# Patient Record
Sex: FEMALE | Race: WHITE | HISPANIC OR LATINO | URBAN - METROPOLITAN AREA
[De-identification: names, ages, dates, MRNs, and addresses within clinical notes are randomized per-mention and may not be internally consistent; named-entity substitution may affect disease eponyms.]

---

## 2018-12-05 ENCOUNTER — EMERGENCY (EMERGENCY)
Facility: HOSPITAL | Age: 43
LOS: 1 days | Discharge: HOME | End: 2018-12-05
Admitting: EMERGENCY MEDICINE
Payer: COMMERCIAL

## 2018-12-05 VITALS
DIASTOLIC BLOOD PRESSURE: 96 MMHG | SYSTOLIC BLOOD PRESSURE: 160 MMHG | TEMPERATURE: 98 F | RESPIRATION RATE: 18 BRPM | OXYGEN SATURATION: 99 % | HEART RATE: 88 BPM

## 2018-12-05 VITALS
OXYGEN SATURATION: 100 % | RESPIRATION RATE: 16 BRPM | SYSTOLIC BLOOD PRESSURE: 155 MMHG | HEART RATE: 82 BPM | DIASTOLIC BLOOD PRESSURE: 90 MMHG

## 2018-12-05 LAB — HCG UR QL: NEGATIVE — SIGNIFICANT CHANGE UP

## 2018-12-05 PROCEDURE — 93970 EXTREMITY STUDY: CPT | Mod: 26

## 2018-12-05 PROCEDURE — 99284 EMERGENCY DEPT VISIT MOD MDM: CPT

## 2018-12-05 RX ORDER — KETOROLAC TROMETHAMINE 30 MG/ML
60 SYRINGE (ML) INJECTION ONCE
Qty: 0 | Refills: 0 | Status: DISCONTINUED | OUTPATIENT
Start: 2018-12-05 | End: 2018-12-05

## 2018-12-05 RX ADMIN — Medication 60 MILLIGRAM(S): at 15:31

## 2018-12-05 NOTE — ED PROVIDER NOTE - OBJECTIVE STATEMENT
43 y o female with Hx of varicose veins (denying corrective surgery), dependant edema, and FHx of DM and HTN, presents to ED for c/o left leg and right hip pain x2 weeks. States pain and bilateral ankle edema is aggravated when standing for extended periods of time. Pt maintains low physical activity and smokes 1 pack of cigarettes every 3 days. Has taken Advil dose every 2 hrs for pain for x1 week, with no Advil intake today. Denies recent travel or surgeries. No chest pain, SOB, N/V, headache, motor deficits, or other sx. 43 y o female with Hx of varicose veins, dependant edema, and FHx of DM and HTN, presents to ED for c/o left leg and right hip pain x2 weeks. States pain and bilateral ankle edema is aggravated when standing for extended periods of time. Pt maintains low physical activity and smokes 1 pack of cigarettes every 3 days. Has taken Advil 200 mg dose every 2 hrs for pain for x1 week, with no Advil intake today. Denies recent travel or surgeries. No chest pain, SOB, N/V, headache, motor deficits, or other sx.

## 2018-12-05 NOTE — ED ADULT TRIAGE NOTE - ESI TRIAGE ACUITY LEVEL, MLM
4
Airway patent, Nasal mucosa clear. Mouth with normal mucosa. Throat has no vesicles, no oropharyngeal exudates and uvula is midline. Atraumatic.

## 2018-12-05 NOTE — ED PROVIDER NOTE - CHPI ED SYMPTOMS NEG
no stiffness/no fever/no chest pain, no SOB, no N/V, no headache/no tingling/no weakness/no numbness

## 2018-12-05 NOTE — ED PROVIDER NOTE - SKIN, MLM
Skin normal color for race, warm, dry and intact. No evidence of rash. Skin normal color for race, warm, dry and intact. No evidence of rash. no erythema or warmth to knee/hip/ankle b/l

## 2018-12-05 NOTE — ED PROVIDER NOTE - CARE PROVIDER_API CALL
Terence Hutchison), Orthopaedic Surgery  [address]  [address] [place] [address]  Phone: [phone]  Fax: [phone]

## 2018-12-05 NOTE — ED PROVIDER NOTE - MUSCULOSKELETAL, MLM
Bilateral knee swelling. No calf tenderness present. slight left knee swelling. No calf tenderness present.

## 2018-12-05 NOTE — ED PROVIDER NOTE - MEDICAL DECISION MAKING DETAILS
43 y o female with hx of varicose veins and dependent edema presents with left leg pain x2 weeks. Will give Toradol depending on UA. US of left lower leg and xray of left knee. Reassess. 43 y o female with hx of varicose veins and dependent edema presents with left leg pain x2 weeks. Will give Toradol for pain, US of left lower leg and xray of left knee. Reassess.  bakers cyst. ELI and ortho f/u

## 2018-12-09 DIAGNOSIS — M71.22 SYNOVIAL CYST OF POPLITEAL SPACE [BAKER], LEFT KNEE: ICD-10-CM

## 2018-12-09 DIAGNOSIS — R60.0 LOCALIZED EDEMA: ICD-10-CM

## 2018-12-09 DIAGNOSIS — F17.210 NICOTINE DEPENDENCE, CIGARETTES, UNCOMPLICATED: ICD-10-CM

## 2021-05-26 NOTE — ED ADULT NURSE NOTE - NS ED NURSE RECORD ANOTHER VITAL SIGN
----- Message from Harry Bhat MD sent at 5/26/2021  6:49 AM CDT -----  Send normal card  Continue current medications   Follow up as planned    
Yes

## 2023-04-19 NOTE — ED ADULT TRIAGE NOTE - MODE OF ARRIVAL
Walk in
Diet, NPO after Midnight:      NPO Start Date: 18-Apr-2023,   NPO Start Time: 23:59 (04-18-23 @ 15:46)  Previously: DASH, CSTCHO, No Concentrated Phosphorus, 1000 mL Fluid Restriction (4/10-4/18)